# Patient Record
Sex: MALE | Race: BLACK OR AFRICAN AMERICAN | NOT HISPANIC OR LATINO | Employment: UNEMPLOYED | URBAN - METROPOLITAN AREA
[De-identification: names, ages, dates, MRNs, and addresses within clinical notes are randomized per-mention and may not be internally consistent; named-entity substitution may affect disease eponyms.]

---

## 2024-05-17 ENCOUNTER — APPOINTMENT (OUTPATIENT)
Dept: RADIOLOGY | Facility: HOSPITAL | Age: 49
End: 2024-05-17
Payer: COMMERCIAL

## 2024-05-17 ENCOUNTER — HOSPITAL ENCOUNTER (EMERGENCY)
Facility: HOSPITAL | Age: 49
Discharge: LEFT BEFORE DISCHARGE | End: 2024-05-17
Attending: STUDENT IN AN ORGANIZED HEALTH CARE EDUCATION/TRAINING PROGRAM
Payer: COMMERCIAL

## 2024-05-17 ENCOUNTER — APPOINTMENT (OUTPATIENT)
Dept: CARDIOLOGY | Facility: HOSPITAL | Age: 49
End: 2024-05-17
Payer: COMMERCIAL

## 2024-05-17 VITALS
WEIGHT: 205 LBS | BODY MASS INDEX: 27.77 KG/M2 | HEIGHT: 72 IN | TEMPERATURE: 97.7 F | RESPIRATION RATE: 16 BRPM | HEART RATE: 75 BPM | OXYGEN SATURATION: 100 % | SYSTOLIC BLOOD PRESSURE: 155 MMHG | DIASTOLIC BLOOD PRESSURE: 94 MMHG

## 2024-05-17 DIAGNOSIS — R07.9 CHEST PAIN, UNSPECIFIED TYPE: Primary | ICD-10-CM

## 2024-05-17 PROCEDURE — 93005 ELECTROCARDIOGRAM TRACING: CPT

## 2024-05-17 PROCEDURE — 99283 EMERGENCY DEPT VISIT LOW MDM: CPT | Mod: 25

## 2024-05-17 ASSESSMENT — LIFESTYLE VARIABLES
TOTAL SCORE: 4
HAVE YOU EVER FELT YOU SHOULD CUT DOWN ON YOUR DRINKING: YES
EVER HAD A DRINK FIRST THING IN THE MORNING TO STEADY YOUR NERVES TO GET RID OF A HANGOVER: YES
EVER FELT BAD OR GUILTY ABOUT YOUR DRINKING: YES
HAVE PEOPLE ANNOYED YOU BY CRITICIZING YOUR DRINKING: YES

## 2024-05-17 ASSESSMENT — COLUMBIA-SUICIDE SEVERITY RATING SCALE - C-SSRS
2. HAVE YOU ACTUALLY HAD ANY THOUGHTS OF KILLING YOURSELF?: NO
6. HAVE YOU EVER DONE ANYTHING, STARTED TO DO ANYTHING, OR PREPARED TO DO ANYTHING TO END YOUR LIFE?: NO
1. IN THE PAST MONTH, HAVE YOU WISHED YOU WERE DEAD OR WISHED YOU COULD GO TO SLEEP AND NOT WAKE UP?: NO

## 2024-05-17 ASSESSMENT — PAIN SCALES - GENERAL
PAINLEVEL_OUTOF10: 0 - NO PAIN
PAINLEVEL_OUTOF10: 0 - NO PAIN

## 2024-05-17 ASSESSMENT — PAIN - FUNCTIONAL ASSESSMENT: PAIN_FUNCTIONAL_ASSESSMENT: 0-10

## 2024-05-17 NOTE — ED PROVIDER NOTES
HPI   Chief Complaint   Patient presents with    Chest Pain       History of hypertension on Norvasc as well as schizophrenia on BuSpar and Seroquel presents from retirement for now resolved episode of chest discomfort.  States that he felt a ache in the left aspect of his chest without radiating quality.  Not exertional.  Not pleuritic.  Possibly associated nausea.  No associated shortness of breath.  Denies fevers, chills, productive cough.  No history of DVT or PE.  Does state that he is a current smoker.  Denies family cardiac history.  Denies personal cardiac history.      History provided by:  Patient   used: No                        [place] Coma Scale Score: 15                     Patient History   No past medical history on file.  Past Surgical History:   Procedure Laterality Date    OTHER SURGICAL HISTORY  09/23/2022    Hand surgery     No family history on file.  Social History     Tobacco Use    Smoking status: Not on file    Smokeless tobacco: Not on file   Substance Use Topics    Alcohol use: Not on file    Drug use: Not on file       Physical Exam   ED Triage Vitals [05/17/24 1237]   Temperature Heart Rate Respirations BP   36.5 °C (97.7 °F) 73 16 (!) 155/94      Pulse Ox Temp src Heart Rate Source Patient Position   100 % -- Monitor --      BP Location FiO2 (%)     -- --       Physical Exam  Vitals and nursing note reviewed.   HENT:      Head: Atraumatic.      Mouth/Throat:      Mouth: Mucous membranes are moist.   Eyes:      Conjunctiva/sclera: Conjunctivae normal.   Cardiovascular:      Rate and Rhythm: Normal rate and regular rhythm.   Pulmonary:      Effort: Pulmonary effort is normal.      Breath sounds: Normal breath sounds.   Abdominal:      Palpations: Abdomen is soft.      Tenderness: There is no abdominal tenderness.   Musculoskeletal:         General: No deformity.      Cervical back: Normal range of motion.   Skin:     General: Skin is warm and dry.   Neurological:       Mental Status: He is alert.      Comments: Moving all extremities         ED Course & MDM   Diagnoses as of 05/17/24 1327   Chest pain, unspecified type       Medical Decision Making  Gentleman with a history of hypertension on Norvasc presents with now resolved episode of an ache to the left aspect of his chest without typical cardiac features.  Considered ACS and planned to obtain troponin values.  As patient is a smoker, did also consider COPD; however, he does not report fever, productive cough, and has no wheezes on exam.    Unfortunate, patient wished to leave AGAINST MEDICAL ADVICE after the initial EKG was obtained.  This ECG was nonischemic.  We discussed the risk of this, such as undiagnosed ACS that could lead to myocardial infarction, heart failure, or even death.  Patient demonstrated understanding of my concerns and still wished to leave against my medical advice.  I did provide him with a referral to cardiology.  I did also recommend that he return to the emergency department anytime.  Like further workup.  I did also highly suggest that he return to the emergency department if his symptoms recur.    This patient has the appropriate insight and judgement to their condition, is free from distracting injury or cognitive impairment, and in my medical judgment has the capacity to make their own decisions. They presented with resolved chest pain, and I am concerned their symptoms may indicate ACS. The patient vocalized understanding of this. As history, physical exam, and EKG are not 100% sensitive in ruling out the disease process I am concerned for, I believe they should undergo further testing/monitoring.  Given that I am concerned about ACS, the complications of this condition if left untreated include: Myocardial infarction, heart failure, dysrhythmia, death. The patient expressed not wanting laboratory test, I offered them alternatives to this including: Cardiology referral. After discussion, the  patient is unwilling to undergo no further testing in the emergency department. They are declining further care at this time, and have elected to sign out against medical advice. I am unable to convince the patient to stay in the hospital. I have asked them to return to the hospital as soon as possible to complete their treatment/evaluation and have informed them that they will be welcome to do so. I will be providing the patient with follow up with cardiology along with discharge paperwork. Prior to signing out against medical advice, the patient had the opportunity to ask questions and these were answered.    Amount and/or Complexity of Data Reviewed  ECG/medicine tests: ordered and independent interpretation performed.     Details: My ECG interpretation: Normal sinus rhythm, rate 72, no ST some elevation, QTc 429        Procedure  Procedures     Chon Birmingham MD  05/17/24 8489

## 2024-05-29 LAB
ATRIAL RATE: 72 BPM
P AXIS: 27 DEGREES
P OFFSET: 193 MS
P ONSET: 134 MS
PR INTERVAL: 160 MS
Q ONSET: 214 MS
QRS COUNT: 12 BEATS
QRS DURATION: 98 MS
QT INTERVAL: 392 MS
QTC CALCULATION(BAZETT): 429 MS
QTC FREDERICIA: 416 MS
R AXIS: 9 DEGREES
T AXIS: 27 DEGREES
T OFFSET: 410 MS
VENTRICULAR RATE: 72 BPM